# Patient Record
Sex: FEMALE | Race: WHITE | Employment: UNEMPLOYED | URBAN - METROPOLITAN AREA
[De-identification: names, ages, dates, MRNs, and addresses within clinical notes are randomized per-mention and may not be internally consistent; named-entity substitution may affect disease eponyms.]

---

## 2018-03-27 ENCOUNTER — HOSPITAL ENCOUNTER (OUTPATIENT)
Age: 33
Discharge: HOME | End: 2018-03-29
Payer: COMMERCIAL

## 2018-03-27 PROCEDURE — G0123 SCREEN CERV/VAG THIN LAYER: HCPCS

## 2018-03-27 PROCEDURE — 87624 HPV HI-RISK TYP POOLED RSLT: CPT

## 2018-03-29 LAB
CORRESPONDING PAP CASE #: NORMAL
HPV, HIGH RISK: NEGATIVE

## 2019-07-15 ENCOUNTER — APPOINTMENT (OUTPATIENT)
Dept: CT IMAGING | Age: 34
DRG: 773 | End: 2019-07-15
Payer: COMMERCIAL

## 2019-07-15 ENCOUNTER — HOSPITAL ENCOUNTER (INPATIENT)
Age: 34
LOS: 2 days | Discharge: HOME | DRG: 773 | End: 2019-07-17
Attending: EMERGENCY MEDICINE | Admitting: INTERNAL MEDICINE
Payer: COMMERCIAL

## 2019-07-15 ENCOUNTER — APPOINTMENT (OUTPATIENT)
Dept: GENERAL RADIOLOGY | Age: 34
DRG: 773 | End: 2019-07-15
Payer: COMMERCIAL

## 2019-07-15 DIAGNOSIS — A41.9 SEPTICEMIA (HCC): ICD-10-CM

## 2019-07-15 DIAGNOSIS — N30.01 ACUTE CYSTITIS WITH HEMATURIA: Primary | ICD-10-CM

## 2019-07-15 DIAGNOSIS — R74.01 TRANSAMINITIS: ICD-10-CM

## 2019-07-15 DIAGNOSIS — N17.9 AKI (ACUTE KIDNEY INJURY) (HCC): ICD-10-CM

## 2019-07-15 DIAGNOSIS — R41.82 ALTERED MENTAL STATUS, UNSPECIFIED ALTERED MENTAL STATUS TYPE: ICD-10-CM

## 2019-07-15 LAB
ACETAMINOPHEN LEVEL: <5 MCG/ML (ref 10–30)
ALBUMIN SERPL-MCNC: 4 G/DL (ref 3.5–5.2)
ALBUMIN SERPL-MCNC: 4.7 G/DL (ref 3.5–5.2)
ALP BLD-CCNC: 77 U/L (ref 35–104)
ALP BLD-CCNC: 98 U/L (ref 35–104)
ALT SERPL-CCNC: 48 U/L (ref 0–32)
ALT SERPL-CCNC: 69 U/L (ref 0–32)
AMMONIA: 24 UMOL/L (ref 11–51)
AMPHETAMINE SCREEN, URINE: NOT DETECTED
ANION GAP SERPL CALCULATED.3IONS-SCNC: 10 MMOL/L (ref 7–16)
ANION GAP SERPL CALCULATED.3IONS-SCNC: 16 MMOL/L (ref 7–16)
APPEARANCE CSF: CLEAR
APTT: 30.3 SEC (ref 24.5–35.1)
AST SERPL-CCNC: 81 U/L (ref 0–31)
AST SERPL-CCNC: 83 U/L (ref 0–31)
B.E.: 3 MMOL/L (ref -3–3)
BACTERIA: ABNORMAL /HPF
BARBITURATE SCREEN URINE: NOT DETECTED
BASOPHILS ABSOLUTE: 0 E9/L (ref 0–0.2)
BASOPHILS ABSOLUTE: 0.02 E9/L (ref 0–0.2)
BASOPHILS RELATIVE PERCENT: 0.1 % (ref 0–2)
BASOPHILS RELATIVE PERCENT: 0.1 % (ref 0–2)
BENZODIAZEPINE SCREEN, URINE: NOT DETECTED
BILIRUB SERPL-MCNC: 1 MG/DL (ref 0–1.2)
BILIRUB SERPL-MCNC: 1.5 MG/DL (ref 0–1.2)
BILIRUBIN DIRECT: 0.5 MG/DL (ref 0–0.3)
BILIRUBIN URINE: ABNORMAL
BILIRUBIN, INDIRECT: 0.5 MG/DL (ref 0–1)
BLOOD, URINE: ABNORMAL
BUN BLDV-MCNC: 38 MG/DL (ref 6–20)
BUN BLDV-MCNC: 55 MG/DL (ref 6–20)
CALCIUM SERPL-MCNC: 8.6 MG/DL (ref 8.6–10.2)
CALCIUM SERPL-MCNC: 9.4 MG/DL (ref 8.6–10.2)
CANNABINOID SCREEN URINE: NOT DETECTED
CHLORIDE BLD-SCNC: 100 MMOL/L (ref 98–107)
CHLORIDE BLD-SCNC: 110 MMOL/L (ref 98–107)
CHLORIDE URINE RANDOM: <20 MMOL/L
CHP ED QC CHECK: YES
CLARITY: ABNORMAL
CO2: 29 MMOL/L (ref 22–29)
CO2: 33 MMOL/L (ref 22–29)
COCAINE METABOLITE SCREEN URINE: NOT DETECTED
COHB: 0.4 % (ref 0–1.5)
COLOR CSF: COLORLESS
COLOR: ABNORMAL
CREAT SERPL-MCNC: 0.9 MG/DL (ref 0.5–1)
CREAT SERPL-MCNC: 1.3 MG/DL (ref 0.5–1)
CREATININE URINE: 193 MG/DL (ref 29–226)
CRITICAL: ABNORMAL
DATE ANALYZED: ABNORMAL
DATE OF COLLECTION: ABNORMAL
EOSINOPHILS ABSOLUTE: 0 E9/L (ref 0.05–0.5)
EOSINOPHILS ABSOLUTE: 0 E9/L (ref 0.05–0.5)
EOSINOPHILS RELATIVE PERCENT: 0 % (ref 0–6)
EOSINOPHILS RELATIVE PERCENT: 0 % (ref 0–6)
EPITHELIAL CELLS, UA: ABNORMAL /HPF
ETHANOL: <10 MG/DL (ref 0–0.08)
GFR AFRICAN AMERICAN: 57
GFR AFRICAN AMERICAN: >60
GFR NON-AFRICAN AMERICAN: 47 ML/MIN/1.73
GFR NON-AFRICAN AMERICAN: >60 ML/MIN/1.73
GLUCOSE BLD-MCNC: 138 MG/DL (ref 74–99)
GLUCOSE BLD-MCNC: 154 MG/DL (ref 74–99)
GLUCOSE BLD-MCNC: 155 MG/DL
GLUCOSE URINE: NEGATIVE MG/DL
GLUCOSE, CSF: 98 MG/DL (ref 40–70)
GRAM STAIN ORDERABLE: NORMAL
HCG QUALITATIVE: NEGATIVE
HCG, URINE, POC: NEGATIVE
HCO3: 25.3 MMOL/L (ref 22–26)
HCT VFR BLD CALC: 35.6 % (ref 34–48)
HCT VFR BLD CALC: 43.6 % (ref 34–48)
HEMOGLOBIN: 11.5 G/DL (ref 11.5–15.5)
HEMOGLOBIN: 13.9 G/DL (ref 11.5–15.5)
HHB: 0.5 % (ref 0–5)
IMMATURE GRANULOCYTES #: 0.14 E9/L
IMMATURE GRANULOCYTES %: 0.8 % (ref 0–5)
INR BLD: 1.3
KETONES, URINE: 15 MG/DL
LAB: ABNORMAL
LACTIC ACID: 2.2 MMOL/L (ref 0.5–2.2)
LEUKOCYTE ESTERASE, URINE: ABNORMAL
LYMPHOCYTES ABSOLUTE: 1.3 E9/L (ref 1.5–4)
LYMPHOCYTES ABSOLUTE: 2.63 E9/L (ref 1.5–4)
LYMPHOCYTES RELATIVE PERCENT: 12.2 % (ref 20–42)
LYMPHOCYTES RELATIVE PERCENT: 7.5 % (ref 20–42)
Lab: ABNORMAL
Lab: NORMAL
MCH RBC QN AUTO: 29.8 PG (ref 26–35)
MCH RBC QN AUTO: 30.1 PG (ref 26–35)
MCHC RBC AUTO-ENTMCNC: 31.9 % (ref 32–34.5)
MCHC RBC AUTO-ENTMCNC: 32.3 % (ref 32–34.5)
MCV RBC AUTO: 93.2 FL (ref 80–99.9)
MCV RBC AUTO: 93.6 FL (ref 80–99.9)
METHADONE SCREEN, URINE: NOT DETECTED
METHB: 0.4 % (ref 0–1.5)
MODE: ABNORMAL
MONOCYTE, CSF: 50 % (ref 10–70)
MONOCYTES ABSOLUTE: 0.88 E9/L (ref 0.1–0.95)
MONOCYTES ABSOLUTE: 0.94 E9/L (ref 0.1–0.95)
MONOCYTES RELATIVE PERCENT: 4.3 % (ref 2–12)
MONOCYTES RELATIVE PERCENT: 5.4 % (ref 2–12)
NEGATIVE QC PASS/FAIL: NORMAL
NEUTROPHILS ABSOLUTE: 15 E9/L (ref 1.8–7.3)
NEUTROPHILS ABSOLUTE: 18.4 E9/L (ref 1.8–7.3)
NEUTROPHILS RELATIVE PERCENT: 83.5 % (ref 43–80)
NEUTROPHILS RELATIVE PERCENT: 86.2 % (ref 43–80)
NEUTROPHILS, CSF: 50 % (ref 0–10)
NITRITE, URINE: POSITIVE
O2 CONTENT: 22.3 ML/DL
O2 SATURATION: 99.5 % (ref 92–98.5)
O2HB: 98.7 % (ref 94–97)
OPERATOR ID: 1868
OPIATE SCREEN URINE: NOT DETECTED
OVALOCYTES: ABNORMAL
PATIENT TEMP: 37 C
PCO2: 32.4 MMHG (ref 35–45)
PDW BLD-RTO: 13.8 FL (ref 11.5–15)
PDW BLD-RTO: 13.9 FL (ref 11.5–15)
PH BLOOD GAS: 7.51 (ref 7.35–7.45)
PH UA: 5.5 (ref 5–9)
PHENCYCLIDINE SCREEN URINE: NOT DETECTED
PLATELET # BLD: 222 E9/L (ref 130–450)
PLATELET # BLD: 297 E9/L (ref 130–450)
PMV BLD AUTO: 10.2 FL (ref 7–12)
PMV BLD AUTO: 9.8 FL (ref 7–12)
PO2: 242.5 MMHG (ref 60–100)
POIKILOCYTES: ABNORMAL
POSITIVE QC PASS/FAIL: NORMAL
POTASSIUM SERPL-SCNC: 3.3 MMOL/L (ref 3.5–5)
POTASSIUM SERPL-SCNC: 3.7 MMOL/L (ref 3.5–5)
POTASSIUM, UR: 53.1 MMOL/L
PROPOXYPHENE SCREEN: NOT DETECTED
PROTEIN CSF: 52 MG/DL (ref 15–40)
PROTEIN UA: 100 MG/DL
PROTHROMBIN TIME: 15 SEC (ref 9.3–12.4)
RBC # BLD: 3.82 E12/L (ref 3.5–5.5)
RBC # BLD: 4.66 E12/L (ref 3.5–5.5)
RBC CSF: <2000 /UL
RBC UA: >20 /HPF (ref 0–2)
SALICYLATE, SERUM: <0.3 MG/DL (ref 0–30)
SODIUM BLD-SCNC: 149 MMOL/L (ref 132–146)
SODIUM BLD-SCNC: 149 MMOL/L (ref 132–146)
SODIUM URINE: 36 MMOL/L
SOURCE, BLOOD GAS: ABNORMAL
SPECIFIC GRAVITY UA: 1.02 (ref 1–1.03)
THB: 15.7 G/DL (ref 11.5–16.5)
TIME ANALYZED: 1255
TOTAL CK: 1163 U/L (ref 20–180)
TOTAL PROTEIN: 7.2 G/DL (ref 6.4–8.3)
TOTAL PROTEIN: 8.7 G/DL (ref 6.4–8.3)
TRICYCLIC ANTIDEPRESSANTS SCREEN SERUM: NEGATIVE NG/ML
TROPONIN: <0.01 NG/ML (ref 0–0.03)
TUBE NUMBER CSF: ABNORMAL
UROBILINOGEN, URINE: 4 E.U./DL
WBC # BLD: 17.4 E9/L (ref 4.5–11.5)
WBC # BLD: 21.9 E9/L (ref 4.5–11.5)
WBC CSF: <3 /UL (ref 0–2)
WBC UA: ABNORMAL /HPF (ref 0–5)

## 2019-07-15 PROCEDURE — 70450 CT HEAD/BRAIN W/O DYE: CPT

## 2019-07-15 PROCEDURE — 84703 CHORIONIC GONADOTROPIN ASSAY: CPT

## 2019-07-15 PROCEDURE — 87040 BLOOD CULTURE FOR BACTERIA: CPT

## 2019-07-15 PROCEDURE — 89051 BODY FLUID CELL COUNT: CPT

## 2019-07-15 PROCEDURE — 82805 BLOOD GASES W/O2 SATURATION: CPT

## 2019-07-15 PROCEDURE — 87205 SMEAR GRAM STAIN: CPT

## 2019-07-15 PROCEDURE — 96375 TX/PRO/DX INJ NEW DRUG ADDON: CPT

## 2019-07-15 PROCEDURE — 84157 ASSAY OF PROTEIN OTHER: CPT

## 2019-07-15 PROCEDURE — 87088 URINE BACTERIA CULTURE: CPT

## 2019-07-15 PROCEDURE — 6360000002 HC RX W HCPCS: Performed by: INTERNAL MEDICINE

## 2019-07-15 PROCEDURE — 85025 COMPLETE CBC W/AUTO DIFF WBC: CPT

## 2019-07-15 PROCEDURE — 6370000000 HC RX 637 (ALT 250 FOR IP): Performed by: STUDENT IN AN ORGANIZED HEALTH CARE EDUCATION/TRAINING PROGRAM

## 2019-07-15 PROCEDURE — 2060000000 HC ICU INTERMEDIATE R&B

## 2019-07-15 PROCEDURE — 85730 THROMBOPLASTIN TIME PARTIAL: CPT

## 2019-07-15 PROCEDURE — 82140 ASSAY OF AMMONIA: CPT

## 2019-07-15 PROCEDURE — G0480 DRUG TEST DEF 1-7 CLASSES: HCPCS

## 2019-07-15 PROCEDURE — 99285 EMERGENCY DEPT VISIT HI MDM: CPT

## 2019-07-15 PROCEDURE — 6360000002 HC RX W HCPCS

## 2019-07-15 PROCEDURE — 82550 ASSAY OF CK (CPK): CPT

## 2019-07-15 PROCEDURE — 87070 CULTURE OTHR SPECIMN AEROBIC: CPT

## 2019-07-15 PROCEDURE — 83605 ASSAY OF LACTIC ACID: CPT

## 2019-07-15 PROCEDURE — 6360000002 HC RX W HCPCS: Performed by: STUDENT IN AN ORGANIZED HEALTH CARE EDUCATION/TRAINING PROGRAM

## 2019-07-15 PROCEDURE — 80307 DRUG TEST PRSMV CHEM ANLYZR: CPT

## 2019-07-15 PROCEDURE — 82570 ASSAY OF URINE CREATININE: CPT

## 2019-07-15 PROCEDURE — 82248 BILIRUBIN DIRECT: CPT

## 2019-07-15 PROCEDURE — 84484 ASSAY OF TROPONIN QUANT: CPT

## 2019-07-15 PROCEDURE — 93005 ELECTROCARDIOGRAM TRACING: CPT | Performed by: EMERGENCY MEDICINE

## 2019-07-15 PROCEDURE — 2580000003 HC RX 258: Performed by: EMERGENCY MEDICINE

## 2019-07-15 PROCEDURE — 82436 ASSAY OF URINE CHLORIDE: CPT

## 2019-07-15 PROCEDURE — 80053 COMPREHEN METABOLIC PANEL: CPT

## 2019-07-15 PROCEDURE — 81001 URINALYSIS AUTO W/SCOPE: CPT

## 2019-07-15 PROCEDURE — 36415 COLL VENOUS BLD VENIPUNCTURE: CPT

## 2019-07-15 PROCEDURE — 2580000003 HC RX 258: Performed by: INTERNAL MEDICINE

## 2019-07-15 PROCEDURE — 96367 TX/PROPH/DG ADDL SEQ IV INF: CPT

## 2019-07-15 PROCEDURE — 6360000002 HC RX W HCPCS: Performed by: EMERGENCY MEDICINE

## 2019-07-15 PROCEDURE — 96365 THER/PROPH/DIAG IV INF INIT: CPT

## 2019-07-15 PROCEDURE — 82945 GLUCOSE OTHER FLUID: CPT

## 2019-07-15 PROCEDURE — 2580000003 HC RX 258: Performed by: STUDENT IN AN ORGANIZED HEALTH CARE EDUCATION/TRAINING PROGRAM

## 2019-07-15 PROCEDURE — 71045 X-RAY EXAM CHEST 1 VIEW: CPT

## 2019-07-15 PROCEDURE — 84133 ASSAY OF URINE POTASSIUM: CPT

## 2019-07-15 PROCEDURE — 85610 PROTHROMBIN TIME: CPT

## 2019-07-15 PROCEDURE — 84300 ASSAY OF URINE SODIUM: CPT

## 2019-07-15 RX ORDER — KETOROLAC TROMETHAMINE 30 MG/ML
15 INJECTION, SOLUTION INTRAMUSCULAR; INTRAVENOUS ONCE
Status: COMPLETED | OUTPATIENT
Start: 2019-07-15 | End: 2019-07-15

## 2019-07-15 RX ORDER — SODIUM CHLORIDE, SODIUM LACTATE, POTASSIUM CHLORIDE, CALCIUM CHLORIDE 600; 310; 30; 20 MG/100ML; MG/100ML; MG/100ML; MG/100ML
INJECTION, SOLUTION INTRAVENOUS CONTINUOUS
Status: DISCONTINUED | OUTPATIENT
Start: 2019-07-15 | End: 2019-07-16

## 2019-07-15 RX ORDER — SODIUM CHLORIDE 9 MG/ML
INJECTION, SOLUTION INTRAVENOUS CONTINUOUS
Status: DISCONTINUED | OUTPATIENT
Start: 2019-07-15 | End: 2019-07-15

## 2019-07-15 RX ORDER — ACETAMINOPHEN 325 MG/1
650 TABLET ORAL EVERY 4 HOURS PRN
Status: DISCONTINUED | OUTPATIENT
Start: 2019-07-15 | End: 2019-07-17 | Stop reason: HOSPADM

## 2019-07-15 RX ORDER — NALOXONE HYDROCHLORIDE 1 MG/ML
INJECTION INTRAMUSCULAR; INTRAVENOUS; SUBCUTANEOUS
Status: COMPLETED
Start: 2019-07-15 | End: 2019-07-15

## 2019-07-15 RX ORDER — SODIUM CHLORIDE 0.9 % (FLUSH) 0.9 %
10 SYRINGE (ML) INJECTION EVERY 12 HOURS SCHEDULED
Status: DISCONTINUED | OUTPATIENT
Start: 2019-07-15 | End: 2019-07-17 | Stop reason: HOSPADM

## 2019-07-15 RX ORDER — DEXAMETHASONE SODIUM PHOSPHATE 10 MG/ML
10 INJECTION INTRAMUSCULAR; INTRAVENOUS ONCE
Status: COMPLETED | OUTPATIENT
Start: 2019-07-15 | End: 2019-07-15

## 2019-07-15 RX ORDER — NALOXONE HYDROCHLORIDE 1 MG/ML
2 INJECTION INTRAMUSCULAR; INTRAVENOUS; SUBCUTANEOUS ONCE
Status: COMPLETED | OUTPATIENT
Start: 2019-07-15 | End: 2019-07-15

## 2019-07-15 RX ORDER — SODIUM CHLORIDE 0.9 % (FLUSH) 0.9 %
10 SYRINGE (ML) INJECTION PRN
Status: DISCONTINUED | OUTPATIENT
Start: 2019-07-15 | End: 2019-07-17 | Stop reason: HOSPADM

## 2019-07-15 RX ORDER — ONDANSETRON 2 MG/ML
4 INJECTION INTRAMUSCULAR; INTRAVENOUS ONCE
Status: DISCONTINUED | OUTPATIENT
Start: 2019-07-15 | End: 2019-07-17 | Stop reason: HOSPADM

## 2019-07-15 RX ORDER — POTASSIUM CHLORIDE 7.45 MG/ML
10 INJECTION INTRAVENOUS PRN
Status: DISCONTINUED | OUTPATIENT
Start: 2019-07-15 | End: 2019-07-17

## 2019-07-15 RX ORDER — ONDANSETRON 2 MG/ML
4 INJECTION INTRAMUSCULAR; INTRAVENOUS ONCE
Status: COMPLETED | OUTPATIENT
Start: 2019-07-15 | End: 2019-07-15

## 2019-07-15 RX ORDER — 0.9 % SODIUM CHLORIDE 0.9 %
1000 INTRAVENOUS SOLUTION INTRAVENOUS ONCE
Status: COMPLETED | OUTPATIENT
Start: 2019-07-15 | End: 2019-07-15

## 2019-07-15 RX ORDER — ONDANSETRON 2 MG/ML
4 INJECTION INTRAMUSCULAR; INTRAVENOUS EVERY 4 HOURS PRN
Status: DISCONTINUED | OUTPATIENT
Start: 2019-07-15 | End: 2019-07-17 | Stop reason: HOSPADM

## 2019-07-15 RX ORDER — ACETAMINOPHEN 650 MG/1
650 SUPPOSITORY RECTAL ONCE
Status: COMPLETED | OUTPATIENT
Start: 2019-07-15 | End: 2019-07-15

## 2019-07-15 RX ORDER — POTASSIUM CHLORIDE 20 MEQ/1
40 TABLET, EXTENDED RELEASE ORAL PRN
Status: DISCONTINUED | OUTPATIENT
Start: 2019-07-15 | End: 2019-07-17

## 2019-07-15 RX ADMIN — SODIUM CHLORIDE: 9 INJECTION, SOLUTION INTRAVENOUS at 15:30

## 2019-07-15 RX ADMIN — DEXAMETHASONE SODIUM PHOSPHATE 10 MG: 10 INJECTION INTRAMUSCULAR; INTRAVENOUS at 15:04

## 2019-07-15 RX ADMIN — Medication 10 ML: at 21:05

## 2019-07-15 RX ADMIN — VANCOMYCIN HYDROCHLORIDE 1250 MG: 10 INJECTION, POWDER, LYOPHILIZED, FOR SOLUTION INTRAVENOUS at 17:43

## 2019-07-15 RX ADMIN — SODIUM CHLORIDE 1000 ML: 9 INJECTION, SOLUTION INTRAVENOUS at 13:05

## 2019-07-15 RX ADMIN — ACYCLOVIR SODIUM 580 MG: 50 INJECTION, SOLUTION INTRAVENOUS at 16:26

## 2019-07-15 RX ADMIN — NALOXONE HYDROCHLORIDE 2 MG: 1 INJECTION INTRAMUSCULAR; INTRAVENOUS; SUBCUTANEOUS at 12:47

## 2019-07-15 RX ADMIN — ENOXAPARIN SODIUM 40 MG: 40 INJECTION SUBCUTANEOUS at 21:04

## 2019-07-15 RX ADMIN — ACETAMINOPHEN 650 MG: 650 SUPPOSITORY RECTAL at 15:05

## 2019-07-15 RX ADMIN — KETOROLAC TROMETHAMINE 15 MG: 30 INJECTION, SOLUTION INTRAMUSCULAR at 16:26

## 2019-07-15 RX ADMIN — ONDANSETRON HYDROCHLORIDE 4 MG: 2 SOLUTION INTRAMUSCULAR; INTRAVENOUS at 15:29

## 2019-07-15 RX ADMIN — NALOXONE HYDROCHLORIDE 2 MG: 1 INJECTION PARENTERAL at 12:47

## 2019-07-15 RX ADMIN — CEFTRIAXONE 2 G: 2 INJECTION, POWDER, FOR SOLUTION INTRAMUSCULAR; INTRAVENOUS at 15:00

## 2019-07-15 ASSESSMENT — PAIN SCALES - GENERAL
PAINLEVEL_OUTOF10: 0
PAINLEVEL_OUTOF10: 3
PAINLEVEL_OUTOF10: 0
PAINLEVEL_OUTOF10: 0

## 2019-07-15 NOTE — ED NOTES
Central Monitoring@ 6631  [facility] [place]:  Aylin Handing Paul  07/15/19 7203

## 2019-07-15 NOTE — ED PROVIDER NOTES
Immature Granulocytes % 0.8 0.0 - 5.0 %    Lymphocytes % 7.5 (L) 20.0 - 42.0 %    Monocytes % 5.4 2.0 - 12.0 %    Eosinophils % 0.0 0.0 - 6.0 %    Basophils % 0.1 0.0 - 2.0 %    Neutrophils # 15.00 (H) 1.80 - 7.30 E9/L    Immature Granulocytes # 0.14 E9/L    Lymphocytes # 1.30 (L) 1.50 - 4.00 E9/L    Monocytes # 0.94 0.10 - 0.95 E9/L    Eosinophils # 0.00 (L) 0.05 - 0.50 E9/L    Basophils # 0.02 0.00 - 0.20 E9/L   Hepatic function panel   Result Value Ref Range    Bilirubin, Direct 0.5 (H) 0.0 - 0.3 mg/dL    Bilirubin, Indirect 0.5 0.0 - 1.0 mg/dL   POCT Glucose   Result Value Ref Range    Glucose 155 mg/dL    QC OK? yes    POC Pregnancy Urine Qual   Result Value Ref Range    HCG, Urine, POC Negative Negative    Lot Number [id number]     Positive QC Pass/Fail Pass     Negative QC Pass/Fail Pass    EKG 12 Lead   Result Value Ref Range    Ventricular Rate 104 BPM    Atrial Rate 104 BPM    P-R Interval 130 ms    QRS Duration 70 ms    Q-T Interval 344 ms    QTc Calculation (Bazett) 452 ms    P Axis 82 degrees    R Axis 75 degrees    T Axis -85 degrees       RADIOLOGY:  XR CHEST PORTABLE   Final Result   No acute cardiopulmonary findings. CT Head WO Contrast   Final Result   1. No CT evidence of acute intracranial abnormality, as questioned. If   there is clinical concern for potential underlying acute   cerebrovascular infarction/accident or other potential abnormalities   of underlying brain parenchyma, MRI of the brain would be recommended   for more detailed evaluation. .          EKG: This EKG is signed and interpreted by me. Rate: 104  Rhythm: Sinus  Interpretation: non-specific EKG  Comparison: no previous EKG and no previous EKG available      ------------------------- NURSING NOTES AND VITALS REVIEWED ---------------------------  Date / Time Roomed:  7/15/2019 12:42 PM  ED Bed Assignment:  [id number]    The nursing notes within the ED encounter and vital signs as below have been reviewed.      Patient

## 2019-07-15 NOTE — ED NOTES
UNABLE TO OBTAIN LAB FROM [facility] AT THIS TIME, PER DR MATHUR. PREPPING PT FOR CENTRAL LINE PLACEMENT.       Celso Wood RN  07/15/19 540 Karen Segovia RN  07/15/19 0670

## 2019-07-16 PROBLEM — R00.0 SINUS TACHYCARDIA: Status: ACTIVE | Noted: 2019-07-16

## 2019-07-16 PROBLEM — R50.9 FEVER: Status: ACTIVE | Noted: 2019-07-16

## 2019-07-16 PROBLEM — G93.40 ENCEPHALOPATHY ACUTE: Status: ACTIVE | Noted: 2019-07-16

## 2019-07-16 PROBLEM — E86.0 DEHYDRATION: Status: ACTIVE | Noted: 2019-07-16

## 2019-07-16 PROBLEM — E87.0 HYPERNATREMIA: Status: ACTIVE | Noted: 2019-07-16

## 2019-07-16 LAB
ALBUMIN SERPL-MCNC: 3.8 G/DL (ref 3.5–5.2)
ALP BLD-CCNC: 69 U/L (ref 35–104)
ALT SERPL-CCNC: 62 U/L (ref 0–32)
ANION GAP SERPL CALCULATED.3IONS-SCNC: 10 MMOL/L (ref 7–16)
ANION GAP SERPL CALCULATED.3IONS-SCNC: 9 MMOL/L (ref 7–16)
AST SERPL-CCNC: 52 U/L (ref 0–31)
BASOPHILS ABSOLUTE: 0.01 E9/L (ref 0–0.2)
BASOPHILS RELATIVE PERCENT: 0.1 % (ref 0–2)
BILIRUB SERPL-MCNC: 0.7 MG/DL (ref 0–1.2)
BUN BLDV-MCNC: 25 MG/DL (ref 6–20)
BUN BLDV-MCNC: 30 MG/DL (ref 6–20)
CALCIUM SERPL-MCNC: 8.3 MG/DL (ref 8.6–10.2)
CALCIUM SERPL-MCNC: 8.8 MG/DL (ref 8.6–10.2)
CHLORIDE BLD-SCNC: 109 MMOL/L (ref 98–107)
CHLORIDE BLD-SCNC: 112 MMOL/L (ref 98–107)
CO2: 27 MMOL/L (ref 22–29)
CO2: 29 MMOL/L (ref 22–29)
CREAT SERPL-MCNC: 0.7 MG/DL (ref 0.5–1)
CREAT SERPL-MCNC: 0.9 MG/DL (ref 0.5–1)
EKG ATRIAL RATE: 104 BPM
EKG P AXIS: 82 DEGREES
EKG P-R INTERVAL: 130 MS
EKG Q-T INTERVAL: 344 MS
EKG QRS DURATION: 70 MS
EKG QTC CALCULATION (BAZETT): 452 MS
EKG R AXIS: 75 DEGREES
EKG T AXIS: -85 DEGREES
EKG VENTRICULAR RATE: 104 BPM
EOSINOPHILS ABSOLUTE: 0 E9/L (ref 0.05–0.5)
EOSINOPHILS RELATIVE PERCENT: 0 % (ref 0–6)
FILM ARRAY ADENOVIRUS: NORMAL
FILM ARRAY BORDETELLA PERTUSSIS: NORMAL
FILM ARRAY CHLAMYDOPHILIA PNEUMONIAE: NORMAL
FILM ARRAY CORONAVIRUS 229E: NORMAL
FILM ARRAY CORONAVIRUS HKU1: NORMAL
FILM ARRAY CORONAVIRUS NL63: NORMAL
FILM ARRAY CORONAVIRUS OC43: NORMAL
FILM ARRAY INFLUENZA A VIRUS 09H1: NORMAL
FILM ARRAY INFLUENZA A VIRUS H1: NORMAL
FILM ARRAY INFLUENZA A VIRUS H3: NORMAL
FILM ARRAY INFLUENZA A VIRUS: NORMAL
FILM ARRAY INFLUENZA B: NORMAL
FILM ARRAY METAPNEUMOVIRUS: NORMAL
FILM ARRAY MYCOPLASMA PNEUMONIAE: NORMAL
FILM ARRAY PARAINFLUENZA VIRUS 1: NORMAL
FILM ARRAY PARAINFLUENZA VIRUS 2: NORMAL
FILM ARRAY PARAINFLUENZA VIRUS 3: NORMAL
FILM ARRAY PARAINFLUENZA VIRUS 4: NORMAL
FILM ARRAY RESPIRATORY SYNCITIAL VIRUS: NORMAL
FILM ARRAY RHINOVIRUS/ENTEROVIRUS: NORMAL
GFR AFRICAN AMERICAN: >60
GFR AFRICAN AMERICAN: >60
GFR NON-AFRICAN AMERICAN: >60 ML/MIN/1.73
GFR NON-AFRICAN AMERICAN: >60 ML/MIN/1.73
GLUCOSE BLD-MCNC: 109 MG/DL (ref 74–99)
GLUCOSE BLD-MCNC: 122 MG/DL (ref 74–99)
HCT VFR BLD CALC: 32.7 % (ref 34–48)
HEMOGLOBIN: 10.3 G/DL (ref 11.5–15.5)
IMMATURE GRANULOCYTES #: 0.12 E9/L
IMMATURE GRANULOCYTES %: 0.8 % (ref 0–5)
LYMPHOCYTES ABSOLUTE: 2.02 E9/L (ref 1.5–4)
LYMPHOCYTES RELATIVE PERCENT: 12.9 % (ref 20–42)
MCH RBC QN AUTO: 29.8 PG (ref 26–35)
MCHC RBC AUTO-ENTMCNC: 31.5 % (ref 32–34.5)
MCV RBC AUTO: 94.5 FL (ref 80–99.9)
MONOCYTES ABSOLUTE: 1.46 E9/L (ref 0.1–0.95)
MONOCYTES RELATIVE PERCENT: 9.3 % (ref 2–12)
NEUTROPHILS ABSOLUTE: 12.03 E9/L (ref 1.8–7.3)
NEUTROPHILS RELATIVE PERCENT: 76.9 % (ref 43–80)
PDW BLD-RTO: 13.8 FL (ref 11.5–15)
PLATELET # BLD: 184 E9/L (ref 130–450)
PMV BLD AUTO: 10.2 FL (ref 7–12)
POTASSIUM SERPL-SCNC: 3.2 MMOL/L (ref 3.5–5)
POTASSIUM SERPL-SCNC: 3.9 MMOL/L (ref 3.5–5)
RBC # BLD: 3.46 E12/L (ref 3.5–5.5)
SODIUM BLD-SCNC: 145 MMOL/L (ref 132–146)
SODIUM BLD-SCNC: 151 MMOL/L (ref 132–146)
TOTAL CK: 898 U/L (ref 20–180)
TOTAL CK: 908 U/L (ref 20–180)
TOTAL PROTEIN: 6.5 G/DL (ref 6.4–8.3)
TROPONIN: <0.01 NG/ML (ref 0–0.03)
TSH SERPL DL<=0.05 MIU/L-ACNC: 0.88 UIU/ML (ref 0.27–4.2)
WBC # BLD: 15.6 E9/L (ref 4.5–11.5)

## 2019-07-16 PROCEDURE — 2580000003 HC RX 258: Performed by: INTERNAL MEDICINE

## 2019-07-16 PROCEDURE — 99223 1ST HOSP IP/OBS HIGH 75: CPT | Performed by: INTERNAL MEDICINE

## 2019-07-16 PROCEDURE — 87798 DETECT AGENT NOS DNA AMP: CPT

## 2019-07-16 PROCEDURE — 97161 PT EVAL LOW COMPLEX 20 MIN: CPT | Performed by: PHYSICAL THERAPIST

## 2019-07-16 PROCEDURE — 6360000002 HC RX W HCPCS: Performed by: INTERNAL MEDICINE

## 2019-07-16 PROCEDURE — 93010 ELECTROCARDIOGRAM REPORT: CPT | Performed by: INTERNAL MEDICINE

## 2019-07-16 PROCEDURE — 99253 IP/OBS CNSLTJ NEW/EST LOW 45: CPT | Performed by: PSYCHIATRY & NEUROLOGY

## 2019-07-16 PROCEDURE — 82550 ASSAY OF CK (CPK): CPT

## 2019-07-16 PROCEDURE — 80053 COMPREHEN METABOLIC PANEL: CPT

## 2019-07-16 PROCEDURE — 6370000000 HC RX 637 (ALT 250 FOR IP): Performed by: INTERNAL MEDICINE

## 2019-07-16 PROCEDURE — 87486 CHLMYD PNEUM DNA AMP PROBE: CPT

## 2019-07-16 PROCEDURE — 87633 RESP VIRUS 12-25 TARGETS: CPT

## 2019-07-16 PROCEDURE — 83735 ASSAY OF MAGNESIUM: CPT

## 2019-07-16 PROCEDURE — 97165 OT EVAL LOW COMPLEX 30 MIN: CPT

## 2019-07-16 PROCEDURE — 80048 BASIC METABOLIC PNL TOTAL CA: CPT

## 2019-07-16 PROCEDURE — 36415 COLL VENOUS BLD VENIPUNCTURE: CPT

## 2019-07-16 PROCEDURE — 84484 ASSAY OF TROPONIN QUANT: CPT

## 2019-07-16 PROCEDURE — 84443 ASSAY THYROID STIM HORMONE: CPT

## 2019-07-16 PROCEDURE — 2060000000 HC ICU INTERMEDIATE R&B

## 2019-07-16 PROCEDURE — 97530 THERAPEUTIC ACTIVITIES: CPT | Performed by: PHYSICAL THERAPIST

## 2019-07-16 PROCEDURE — 87581 M.PNEUMON DNA AMP PROBE: CPT

## 2019-07-16 PROCEDURE — 85025 COMPLETE CBC W/AUTO DIFF WBC: CPT

## 2019-07-16 PROCEDURE — 97535 SELF CARE MNGMENT TRAINING: CPT

## 2019-07-16 RX ORDER — LORAZEPAM 2 MG/ML
1 INJECTION INTRAMUSCULAR ONCE
Status: COMPLETED | OUTPATIENT
Start: 2019-07-16 | End: 2019-07-16

## 2019-07-16 RX ORDER — DEXTROSE AND SODIUM CHLORIDE 5; .45 G/100ML; G/100ML
INJECTION, SOLUTION INTRAVENOUS CONTINUOUS
Status: DISCONTINUED | OUTPATIENT
Start: 2019-07-16 | End: 2019-07-16

## 2019-07-16 RX ORDER — SODIUM CHLORIDE, SODIUM LACTATE, POTASSIUM CHLORIDE, CALCIUM CHLORIDE 600; 310; 30; 20 MG/100ML; MG/100ML; MG/100ML; MG/100ML
INJECTION, SOLUTION INTRAVENOUS CONTINUOUS
Status: DISCONTINUED | OUTPATIENT
Start: 2019-07-16 | End: 2019-07-17

## 2019-07-16 RX ADMIN — Medication 10 ML: at 20:14

## 2019-07-16 RX ADMIN — DEXTROSE AND SODIUM CHLORIDE: 5; 450 INJECTION, SOLUTION INTRAVENOUS at 09:49

## 2019-07-16 RX ADMIN — Medication 10 ML: at 09:49

## 2019-07-16 RX ADMIN — Medication 10 ML: at 10:02

## 2019-07-16 RX ADMIN — LORAZEPAM 1 MG: 2 INJECTION INTRAMUSCULAR; INTRAVENOUS at 04:54

## 2019-07-16 RX ADMIN — Medication 10 ML: at 11:12

## 2019-07-16 RX ADMIN — ONDANSETRON HYDROCHLORIDE 4 MG: 2 SOLUTION INTRAMUSCULAR; INTRAVENOUS at 14:58

## 2019-07-16 RX ADMIN — SODIUM CHLORIDE, POTASSIUM CHLORIDE, SODIUM LACTATE AND CALCIUM CHLORIDE: 600; 310; 30; 20 INJECTION, SOLUTION INTRAVENOUS at 22:49

## 2019-07-16 RX ADMIN — SODIUM CHLORIDE, POTASSIUM CHLORIDE, SODIUM LACTATE AND CALCIUM CHLORIDE: 600; 310; 30; 20 INJECTION, SOLUTION INTRAVENOUS at 01:58

## 2019-07-16 RX ADMIN — VANCOMYCIN HYDROCHLORIDE 1000 MG: 1 INJECTION, POWDER, LYOPHILIZED, FOR SOLUTION INTRAVENOUS at 09:49

## 2019-07-16 RX ADMIN — DEXTROSE AND SODIUM CHLORIDE: 5; 450 INJECTION, SOLUTION INTRAVENOUS at 14:57

## 2019-07-16 RX ADMIN — ENOXAPARIN SODIUM 40 MG: 40 INJECTION SUBCUTANEOUS at 09:49

## 2019-07-16 RX ADMIN — ONDANSETRON HYDROCHLORIDE 4 MG: 2 SOLUTION INTRAMUSCULAR; INTRAVENOUS at 20:14

## 2019-07-16 RX ADMIN — POTASSIUM CHLORIDE 40 MEQ: 20 TABLET, EXTENDED RELEASE ORAL at 20:04

## 2019-07-16 RX ADMIN — Medication 10 ML: at 14:58

## 2019-07-16 RX ADMIN — VANCOMYCIN HYDROCHLORIDE 1000 MG: 1 INJECTION, POWDER, LYOPHILIZED, FOR SOLUTION INTRAVENOUS at 22:38

## 2019-07-16 RX ADMIN — Medication 10 ML: at 15:51

## 2019-07-16 RX ADMIN — Medication 10 ML: at 15:57

## 2019-07-16 RX ADMIN — CEFTRIAXONE SODIUM 1 G: 1 INJECTION, POWDER, FOR SOLUTION INTRAMUSCULAR; INTRAVENOUS at 14:58

## 2019-07-16 RX ADMIN — Medication 10 ML: at 15:05

## 2019-07-16 ASSESSMENT — PAIN SCALES - GENERAL
PAINLEVEL_OUTOF10: 0

## 2019-07-16 ASSESSMENT — VISUAL ACUITY: VA_NORMAL: 1

## 2019-07-16 NOTE — H&P
Female Order Date: 7/15/2019 1:00 PM Exam: XR CHEST PORTABLE Number of Images: 1 view Indication:  Possible sepsis Comparison: None. FINDINGS: The lungs are clear. No pleural effusion or pneumothorax is seen. The cardiac silhouette is unremarkable for size. The aorta is unremarkable. A large amount of air is distending the stomach. No acute cardiopulmonary findings. EKG: ST depression in V3, V4, V5, T wave inversion in II, III, aVF    Resident's Assessment and Plan     Patient found unresponsive on the floor, brought to ED, possible ?suboxone overdose. Patient has altered mental status, mother could not be contacted for history. Neurologic  AMS - acute encephalopathy  2/2 ?suboxone overdose vs Meningitis vs Metabolic vs seizures vs psychosis  Urine drug screen negative, patient give narcan but no response  CSF: clear; glucose 98; protein 52; RBC < 2000; WBC < 3;   CT head - no evidence of acute intracranial abnormality. Check ammonia, TSH. Follow blood cultures. Neurology consulted  Psych consult    Pulmonary  Respiratory acidosis  2/2 to hyperventilation 2/2 substance abuse vs AMS  Ph 7.5  pco2 32.4  po2 242  HCO3 25.3    Infectious Disease  Leukocytosis   AMS with RR > 22  WBC 21.9 likely 2/2 dehydration  Received 1 dose of vancomycin, acyclovir and ceftriaxone.    Follow CBC daily;   cultures blood urine csf and fungal  If infectious source is suspicious consider continuing abx    Genitourinary/Renal  YOVANNY likely prerenal; baseline Cr unknown  2/2 to dehydration, poor oral intake  BUN/Cr = 55/1.3 = 42  Follow urine electrolytes and creatinine  Continue IVF    Hypernatremia and hypokalemia  Na 149  K 3.3  2/2 to poor oral intake and dehydration  IVF; replace K as needed  CMP daily    Hematuria  2/2 ?renal stones   U/A rbc>20   Follow cultures    Rhabdomyolysis  2/2 tremors vs seizure vs dehydration  Patient found unresponsive on the floor  CK 1163  Trend CK q6hr  Continue IVF    PT/OT evaluation:

## 2019-07-16 NOTE — PROGRESS NOTES
[facility] 7WE IM  Physical Therapy Initial Evaluation    Name: Amy Oreilly  : 1985  MRN: [id number]    Date of Service: 2019        Evaluating Therapist: Chrissy Renteria PT, DPT       Equipment Recommendations: to be determined. Room #: 7616/5539-X  DIAGNOSIS: AMS   PRECAUTIONS: fall risk, staff member present, droplet isolation    Social:  Pt lives with ?? in a ?? floor plan ? steps and ? rails to enter. When asked pt questions, \"I have a home ? \"   Prior to admission pt walked with ? Initial Evaluation  Date:  Treatment      Short Term/ Long Term   Goals   Was pt agreeable to Eval/treatment? yes     Does pt have pain? No c/o pain      Bed Mobility  Rolling: SBA  Supine to sit: SBA  Sit to supine: SBA  Scooting: NT  supervision   Transfers Sit to stand: min A   Stand to sit: min A  Stand pivot: min A   SBA   Ambulation    30 feet with no device with min/mod A   75+ feet with AAD with SBA   Stair negotiation: ascended and descended  NT  4 steps with 2 rail with SBA   AM-PAC Raw Score 16/24       Pt is alert and Oriented x2   ROM:  L LE grossly WFL  R LE grossly WFL  Strength:   L LE grossly at least 3/5  R LE grossly at least 3/5  Balance: standing min/mod A   Sensation: no c/o numbness/tingling. Endurance: fair      ASSESSMENT  Pt displays functional ability as noted in the objective portion of this evaluation. Comments/Treatment:  Pt found laying in bed agreeable to evaluation. Pt instructed in mobility. Pt demonstrates confusion during session. Pt unsteady with gait and demonstrates tremors. RN in during session. Pt left laying in bed with call button in reach and staff member present end of evaluation. Patient education  Pt educated on mobility. Patient response to education:   Pt verbalized understanding Pt demonstrated skill Pt requires further education in this area     x     Rehab potential is Good for reaching above PT goals. Pts/ family goals   1.  None
Iona Salazar for admitting orders.
OCCUPATIONAL THERAPY INITIAL EVALUATION      Date:2019  Patient Name: Mariam Moreno  MRN: [id number]  : 1985  Room: [address] [place] [address]    Evaluating OT: Jose Luis Brewster, OTR/L [id number]    AM-PAC Daily Activity Raw Score:     Recommended Adaptive Equipment: TBD    Diagnosis: Altered mental status   Surgery: No past surgical history on file. Pertinent Medical History: No past medical history on file. Precautions:  Falls, alarms, droplet isolation, sitter     Home Living: Pt lives with her mother per nursing, however pt is a poor historian and is unable to provide information on home setup, often stating \" I don't remember\" . Prior Level of Function: Pt unable to provide information on prior level of performance. Pain Level: Pt did not indicate pain at present time    Cognition: A&O: . Confusion and memory issues noted throughout   Problem solving:  Poor   Judgement/safety:  Poor     Functional Assessment:   Initial Eval Status  Date: 19 Treatment session:  Short Term Goals  Treatment frequency: 2-5x/wk PRN x1-3 wks   Feeding Min A drinking water from unlided cup  I   Grooming Mod A   I   UB Dressing Min A   I   LB Dressing Mod A   I    Bathing DNA  I   Toileting Mod A   I   Bed Mobility  Supine to sit: I      Functional Transfers Sit to stand Min A   I    Functional Mobility Min/ Mod A dues to unsteady gait  I  during ADLs   Balance Sitting: Fair    Standing: Fair-     Activity Tolerance Fair-  Good iesha x15 min with Good balance during self care tasks           ADL comments: Pt brushed teeth with sponge and used mouth wash while standing at sink with extreme lean on sink. She was able to rubén socks sitting at EOB independently.      Strength ROM Additional Info:    RUE  4/5 WFL Whole body tremors noted throughout body impacting coordination   Good  and  Fair coordination FMC/dexterity noted during ADL tasks     LUE 4/5 WFL Whole body tremors noted throughout body impacting coordination   Good
with Differential:    Lab Results   Component Value Date    WBC 15.6 07/16/2019    RBC 3.46 07/16/2019    HGB 10.3 07/16/2019    HCT 32.7 07/16/2019     07/16/2019    MCV 94.5 07/16/2019    MCH 29.8 07/16/2019    MCHC 31.5 07/16/2019    RDW 13.8 07/16/2019    LYMPHOPCT 12.9 07/16/2019    MONOPCT 9.3 07/16/2019    BASOPCT 0.1 07/16/2019    MONOSABS 1.46 07/16/2019    LYMPHSABS 2.02 07/16/2019    EOSABS 0.00 07/16/2019    BASOSABS 0.01 07/16/2019     CMP:    Lab Results   Component Value Date     07/16/2019    K 3.9 07/16/2019     07/16/2019    CO2 29 07/16/2019    BUN 30 07/16/2019    CREATININE 0.9 07/16/2019    GFRAA >60 07/16/2019    LABGLOM >60 07/16/2019    GLUCOSE 109 07/16/2019    PROT 6.5 07/16/2019    LABALBU 3.8 07/16/2019    CALCIUM 8.8 07/16/2019    BILITOT 0.7 07/16/2019    ALKPHOS 69 07/16/2019    AST 52 07/16/2019    ALT 62 07/16/2019     Hepatic Function Panel:    Lab Results   Component Value Date    ALKPHOS 69 07/16/2019    ALT 62 07/16/2019    AST 52 07/16/2019    PROT 6.5 07/16/2019    BILITOT 0.7 07/16/2019    BILIDIR 0.5 07/15/2019    IBILI 0.5 07/15/2019    LABALBU 3.8 07/16/2019       Resident's Assessment and Plan     37yo female, brought to the ED after being found unresponsive on the floor at home. PMH of heroin abuse, self d/c from rehab facility, was on ?suboxone. At ED presented with fever, altered mental status, RBC in urine, increased CK, respiratory alkalosis, no acute changes in CT head.     Neurologic  Acute encephalopathy   · 2/2 to ?suboxone overdose vs Encephalitis vs Metabolic vs Psychosis  · Urine drug screen clear; patient didn't improve on narcan given in ED  · CSF clear, glucose 98; protein 52; RBC < 2000; WBC < 3  · CT head - no evidence of acute intracranial abnormality  · Neurology consult: AMS in setting of drug withdrawal, dehydration and UTI  · Pscyh consult: Doesn't meet admission criteria, OP psych follow up  · Ammonia 24,  TSH 0.88  · Follow blood

## 2019-07-16 NOTE — PLAN OF CARE
Problem: Falls - Risk of:  Goal: Will remain free from falls  Description  Will remain free from falls  Outcome: Met This Shift     Problem: Confusion - Acute:  Goal: Absence of continued neurological deterioration signs and symptoms  Description  Absence of continued neurological deterioration signs and symptoms  Outcome: Met This Shift  Goal: Mental status will be restored to baseline  Description  Mental status will be restored to baseline  Outcome: Met This Shift     Problem: Discharge Planning:  Goal: Ability to perform activities of daily living will improve  Description  Ability to perform activities of daily living will improve  Outcome: Met This Shift  Goal: Participates in care planning  Description  Participates in care planning  Outcome: Met This Shift     Problem: Injury - Risk of, Physical Injury:  Goal: Will remain free from falls  Description  Will remain free from falls  Outcome: Met This Shift     Problem: Mood - Altered:  Goal: Mood stable  Description  Mood stable  Outcome: Met This Shift  Goal: Absence of abusive behavior  Description  Absence of abusive behavior  Outcome: Met This Shift  Goal: Verbalizations of feeling emotionally comfortable while being cared for will increase  Description  Verbalizations of feeling emotionally comfortable while being cared for will increase  Outcome: Met This Shift     Problem: Sleep Pattern Disturbance:  Goal: Appears well-rested  Description  Appears well-rested  Outcome: Met This Shift

## 2019-07-16 NOTE — PLAN OF CARE
Problem: Coping - Ineffective, Individual:  Goal: Ability to identify and develop effective coping behavior will improve  Description  Ability to identify and develop effective coping behavior will improve  Outcome: Met This Shift     Problem: Injury - Risk of, Substance Overdose:  Goal: No signs of physiological stress  Description  Absence of drug withdrawal signs and symptoms  Outcome: Met This Shift

## 2019-07-16 NOTE — CONSULTS
Amy Oreilly is a 30 y.o. female       Chief Complaint   Patient presents with    Altered Mental Status     pt just left rehab for drug abuse pt on suboxone, pt found altered mental statues by mother, 2mg narcan given intranasally PTA       HPI:    30year old woman presents after altered mental status. She does not have any memory of the events leading to presentation. History taken from mother at bedside. Pt recently decided to self D/C from suboxone For the last week she has been having withdrawal symptoms of nausea vomiting diarrhea. Her mother called EMS after she became less responsive and extremely confused. She has been found to have a UTI and dehydration. LP was negative. After fluids and antibiotics she is doing much better. 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 Prior to Visit Medications    Not on File     Social History     Tobacco Use    Smoking status: Current Some Day Smoker     Packs/day: 1.00     Years: 10.00     Pack years: 10.00     Types: Cigarettes    Smokeless tobacco: Never Used   Substance Use Topics    Alcohol use: Not Currently    Drug use: Yes     Comment: methadone-hx heroin use     No family history on file. No past surgical history on file. Past Medical History:   Diagnosis Date    Heroin abuse ([facility] [address]     Tobacco use      Review of Systems   All other systems reviewed and are negative. Objective:   /69   Pulse 71   Temp 98.2 °F (36.8 °C) (Oral)   Resp 25   Ht 5' 5\" (1.651 m)   Wt 131 lb (59.4 kg)   SpO2 98%   BMI 21.80 kg/m²     Physical Exam   Constitutional: She appears well-nourished. No distress. HENT:   Head: Normocephalic and atraumatic. Eyes: Pupils are equal, round, and reactive to light. Conjunctivae and lids are normal.   Neck: Neck supple. Cardiovascular: Normal rate and regular rhythm. Pulmonary/Chest: Effort normal and breath sounds normal.   Abdominal: Soft. Neurological: She is alert.
Goal Directed  [] Tangential  [] Circumstantial     Thought Content:  [x] Denies [] Endorses [] Suicidal [] Homicidal  [] Delusional      [] Paranoid  [] Somatic  [] Grandiose    Perception: [x]  None  [] Auditory   [] Visual  [] tactile   [] olfactory  [] Illusions         Insight: [] Intact  [x] Fair  [] Limited    Judgement:  [] Intact  [x] Fair  [] Limited        ASSESSMENT  Patient Active Problem List   Diagnosis    Altered mental status    Acute cystitis with hematuria    Encephalopathy acute    Dehydration    Hypernatremia    Fever    Sinus tachycardia     Recommendations and plan of treatment:      Patient is nirav for safety. Patient denies SI, HI, or AVH, and does not meet admission criteria. Please set up with OP psych follow up.           Signed:  Chuck Jennings  7/16/2019  11:17 AM

## 2019-07-17 VITALS
SYSTOLIC BLOOD PRESSURE: 122 MMHG | RESPIRATION RATE: 18 BRPM | TEMPERATURE: 98.1 F | WEIGHT: 135 LBS | BODY MASS INDEX: 22.49 KG/M2 | HEART RATE: 78 BPM | DIASTOLIC BLOOD PRESSURE: 81 MMHG | OXYGEN SATURATION: 98 % | HEIGHT: 65 IN

## 2019-07-17 PROBLEM — D64.9 NORMOCYTIC ANEMIA: Status: ACTIVE | Noted: 2019-07-17

## 2019-07-17 LAB
ALBUMIN SERPL-MCNC: 3.3 G/DL (ref 3.5–5.2)
ALP BLD-CCNC: 58 U/L (ref 35–104)
ALT SERPL-CCNC: 45 U/L (ref 0–32)
ANION GAP SERPL CALCULATED.3IONS-SCNC: 11 MMOL/L (ref 7–16)
ANION GAP SERPL CALCULATED.3IONS-SCNC: 5 MMOL/L (ref 7–16)
ANION GAP SERPL CALCULATED.3IONS-SCNC: 9 MMOL/L (ref 7–16)
AST SERPL-CCNC: 35 U/L (ref 0–31)
BASOPHILS ABSOLUTE: 0.01 E9/L (ref 0–0.2)
BASOPHILS RELATIVE PERCENT: 0.1 % (ref 0–2)
BILIRUB SERPL-MCNC: 0.5 MG/DL (ref 0–1.2)
BUN BLDV-MCNC: 13 MG/DL (ref 6–20)
BUN BLDV-MCNC: 15 MG/DL (ref 6–20)
BUN BLDV-MCNC: 18 MG/DL (ref 6–20)
CALCIUM SERPL-MCNC: 8.3 MG/DL (ref 8.6–10.2)
CALCIUM SERPL-MCNC: 8.4 MG/DL (ref 8.6–10.2)
CALCIUM SERPL-MCNC: 8.5 MG/DL (ref 8.6–10.2)
CHLORIDE BLD-SCNC: 106 MMOL/L (ref 98–107)
CHLORIDE BLD-SCNC: 108 MMOL/L (ref 98–107)
CHLORIDE BLD-SCNC: 108 MMOL/L (ref 98–107)
CO2: 24 MMOL/L (ref 22–29)
CO2: 24 MMOL/L (ref 22–29)
CO2: 29 MMOL/L (ref 22–29)
CREAT SERPL-MCNC: 0.8 MG/DL (ref 0.5–1)
EOSINOPHILS ABSOLUTE: 0.09 E9/L (ref 0.05–0.5)
EOSINOPHILS RELATIVE PERCENT: 0.7 % (ref 0–6)
GFR AFRICAN AMERICAN: >60
GFR NON-AFRICAN AMERICAN: >60 ML/MIN/1.73
GLUCOSE BLD-MCNC: 113 MG/DL (ref 74–99)
GLUCOSE BLD-MCNC: 96 MG/DL (ref 74–99)
GLUCOSE BLD-MCNC: 98 MG/DL (ref 74–99)
HCT VFR BLD CALC: 27.5 % (ref 34–48)
HEMOGLOBIN: 8.9 G/DL (ref 11.5–15.5)
IMMATURE GRANULOCYTES #: 0.1 E9/L
IMMATURE GRANULOCYTES %: 0.7 % (ref 0–5)
LYMPHOCYTES ABSOLUTE: 3.33 E9/L (ref 1.5–4)
LYMPHOCYTES RELATIVE PERCENT: 24.9 % (ref 20–42)
MAGNESIUM: 2.4 MG/DL (ref 1.6–2.6)
MCH RBC QN AUTO: 30.1 PG (ref 26–35)
MCHC RBC AUTO-ENTMCNC: 32.4 % (ref 32–34.5)
MCV RBC AUTO: 92.9 FL (ref 80–99.9)
MONOCYTES ABSOLUTE: 0.99 E9/L (ref 0.1–0.95)
MONOCYTES RELATIVE PERCENT: 7.4 % (ref 2–12)
NEUTROPHILS ABSOLUTE: 8.88 E9/L (ref 1.8–7.3)
NEUTROPHILS RELATIVE PERCENT: 66.2 % (ref 43–80)
PDW BLD-RTO: 13.1 FL (ref 11.5–15)
PLATELET # BLD: 141 E9/L (ref 130–450)
PMV BLD AUTO: 10.1 FL (ref 7–12)
POTASSIUM REFLEX MAGNESIUM: 3 MMOL/L (ref 3.5–5)
POTASSIUM REFLEX MAGNESIUM: 3.7 MMOL/L (ref 3.5–5)
POTASSIUM SERPL-SCNC: 3.9 MMOL/L (ref 3.5–5)
POTASSIUM SERPL-SCNC: 4.4 MMOL/L (ref 3.5–5)
RBC # BLD: 2.96 E12/L (ref 3.5–5.5)
SODIUM BLD-SCNC: 140 MMOL/L (ref 132–146)
SODIUM BLD-SCNC: 141 MMOL/L (ref 132–146)
SODIUM BLD-SCNC: 143 MMOL/L (ref 132–146)
TOTAL PROTEIN: 5.6 G/DL (ref 6.4–8.3)
URINE CULTURE, ROUTINE: NORMAL
WBC # BLD: 13.4 E9/L (ref 4.5–11.5)

## 2019-07-17 PROCEDURE — 2580000003 HC RX 258: Performed by: INTERNAL MEDICINE

## 2019-07-17 PROCEDURE — 36415 COLL VENOUS BLD VENIPUNCTURE: CPT

## 2019-07-17 PROCEDURE — 80053 COMPREHEN METABOLIC PANEL: CPT

## 2019-07-17 PROCEDURE — 6360000002 HC RX W HCPCS: Performed by: INTERNAL MEDICINE

## 2019-07-17 PROCEDURE — 85025 COMPLETE CBC W/AUTO DIFF WBC: CPT

## 2019-07-17 PROCEDURE — 99233 SBSQ HOSP IP/OBS HIGH 50: CPT | Performed by: INTERNAL MEDICINE

## 2019-07-17 PROCEDURE — 6360000002 HC RX W HCPCS: Performed by: PHYSICAL MEDICINE & REHABILITATION

## 2019-07-17 PROCEDURE — 84132 ASSAY OF SERUM POTASSIUM: CPT

## 2019-07-17 PROCEDURE — 80048 BASIC METABOLIC PNL TOTAL CA: CPT

## 2019-07-17 RX ORDER — ONDANSETRON 4 MG/1
4 TABLET, ORALLY DISINTEGRATING ORAL 3 TIMES DAILY PRN
Qty: 6 TABLET | Refills: 0 | Status: SHIPPED | OUTPATIENT
Start: 2019-07-17

## 2019-07-17 RX ORDER — LORAZEPAM 2 MG/ML
1 INJECTION INTRAMUSCULAR ONCE
Status: COMPLETED | OUTPATIENT
Start: 2019-07-17 | End: 2019-07-17

## 2019-07-17 RX ADMIN — ENOXAPARIN SODIUM 40 MG: 40 INJECTION SUBCUTANEOUS at 08:45

## 2019-07-17 RX ADMIN — POTASSIUM CHLORIDE 10 MEQ: 7.46 INJECTION, SOLUTION INTRAVENOUS at 03:09

## 2019-07-17 RX ADMIN — Medication 10 ML: at 08:47

## 2019-07-17 RX ADMIN — CEFTRIAXONE SODIUM 1 G: 1 INJECTION, POWDER, FOR SOLUTION INTRAMUSCULAR; INTRAVENOUS at 14:37

## 2019-07-17 RX ADMIN — POTASSIUM CHLORIDE 10 MEQ: 7.46 INJECTION, SOLUTION INTRAVENOUS at 02:13

## 2019-07-17 RX ADMIN — LORAZEPAM 1 MG: 2 INJECTION INTRAMUSCULAR; INTRAVENOUS at 02:37

## 2019-07-17 RX ADMIN — VANCOMYCIN HYDROCHLORIDE 1000 MG: 1 INJECTION, POWDER, LYOPHILIZED, FOR SOLUTION INTRAVENOUS at 10:47

## 2019-07-17 RX ADMIN — POTASSIUM CHLORIDE 10 MEQ: 7.46 INJECTION, SOLUTION INTRAVENOUS at 01:12

## 2019-07-17 RX ADMIN — ONDANSETRON HYDROCHLORIDE 4 MG: 2 SOLUTION INTRAMUSCULAR; INTRAVENOUS at 08:55

## 2019-07-17 RX ADMIN — ONDANSETRON HYDROCHLORIDE 4 MG: 2 SOLUTION INTRAMUSCULAR; INTRAVENOUS at 01:32

## 2019-07-17 ASSESSMENT — PAIN SCALES - GENERAL: PAINLEVEL_OUTOF10: 0

## 2019-07-17 NOTE — PLAN OF CARE
Problem: Risk for Impaired Skin Integrity  Goal: Tissue integrity - skin and mucous membranes  Description  Structural intactness and normal physiological function of skin and  mucous membranes.   Outcome: Met This Shift     Problem: Falls - Risk of:  Goal: Will remain free from falls  Description  Will remain free from falls  7/17/2019 0333 by Martin Max RN  Outcome: Met This Shift       Problem: Urinary Elimination:  Goal: Signs and symptoms of infection will decrease  Description  Signs and symptoms of infection will decrease  Outcome: Met This Shift     Problem: Discharge Planning:  Goal: Ability to perform activities of daily living will improve  Description  Ability to perform activities of daily living will improve  7/17/2019 0333 by Martin Max RN  Outcome: Met This Shift     Problem: Discharge Planning:  Goal: Participates in care planning  Description  Participates in care planning  7/17/2019 0333 by Martin Max RN  Outcome: Met This Shift     Problem: Discharge Planning:  Goal: Absence of hematoma at arterial access site  Description  Participation in substance abuse program  Outcome: Met This Shift     Problem: Injury - Risk of, Physical Injury:  Goal: Absence of physical injury  Description  Absence of physical injury  Outcome: Met This Shift

## 2019-07-19 LAB
CSF CULTURE: NORMAL
GRAM STAIN RESULT: NORMAL

## 2019-07-20 LAB
BLOOD CULTURE, ROUTINE: NORMAL
CULTURE, BLOOD 2: NORMAL

## 2019-08-15 PROBLEM — E86.0 DEHYDRATION: Status: RESOLVED | Noted: 2019-07-16 | Resolved: 2019-08-15

## 2020-01-10 ENCOUNTER — HOSPITAL ENCOUNTER (OUTPATIENT)
Age: 35
Discharge: HOME | End: 2020-01-12
Payer: COMMERCIAL

## 2020-01-10 PROCEDURE — G0123 SCREEN CERV/VAG THIN LAYER: HCPCS

## 2020-01-10 PROCEDURE — 87624 HPV HI-RISK TYP POOLED RSLT: CPT

## 2020-01-16 LAB
HPV SAMPLE: ABNORMAL
HPV TYPE 16: NOT DETECTED
HPV TYPE 18: NOT DETECTED
HPV, HIGH RISK OTHER: DETECTED
INTERPRETATION: ABNORMAL
SOURCE: ABNORMAL

## 2020-02-12 ENCOUNTER — HOSPITAL ENCOUNTER (OUTPATIENT)
Age: 35
Discharge: HOME | End: 2020-02-14
Payer: COMMERCIAL

## 2020-02-12 PROCEDURE — 88305 TISSUE EXAM BY PATHOLOGIST: CPT

## 2020-09-23 ENCOUNTER — HOSPITAL ENCOUNTER (OUTPATIENT)
Age: 35
Discharge: HOME | End: 2020-09-25
Payer: COMMERCIAL

## 2020-09-23 LAB
BACTERIA: ABNORMAL /HPF
BILIRUBIN URINE: NEGATIVE
BLOOD, URINE: NEGATIVE
CLARITY: CLEAR
COLOR: YELLOW
GLUCOSE URINE: NEGATIVE MG/DL
KETONES, URINE: NEGATIVE MG/DL
LEUKOCYTE ESTERASE, URINE: ABNORMAL
NITRITE, URINE: NEGATIVE
PH UA: 6 (ref 5–9)
PROTEIN UA: NEGATIVE MG/DL
RBC UA: ABNORMAL /HPF (ref 0–2)
SPECIFIC GRAVITY UA: <=1.005 (ref 1–1.03)
UROBILINOGEN, URINE: 0.2 E.U./DL
WBC UA: ABNORMAL /HPF (ref 0–5)

## 2020-09-23 PROCEDURE — 81001 URINALYSIS AUTO W/SCOPE: CPT

## 2020-09-23 PROCEDURE — 87088 URINE BACTERIA CULTURE: CPT

## 2020-09-26 LAB — URINE CULTURE, ROUTINE: NORMAL

## 2023-08-28 ENCOUNTER — HOSPITAL ENCOUNTER (OUTPATIENT)
Age: 38
Discharge: HOME | End: 2023-08-28
Payer: COMMERCIAL

## 2023-08-28 LAB
ALBUMIN SERPL-MCNC: 4.6 G/DL (ref 3.5–5.2)
ALP SERPL-CCNC: 82 U/L (ref 35–104)
ALT SERPL-CCNC: 15 U/L (ref 0–32)
ANION GAP SERPL CALCULATED.3IONS-SCNC: 16 MMOL/L (ref 7–16)
AST SERPL-CCNC: 12 U/L (ref 0–31)
BILIRUB SERPL-MCNC: 0.3 MG/DL (ref 0–1.2)
BUN SERPL-MCNC: 8 MG/DL (ref 6–20)
CALCIUM SERPL-MCNC: 9.5 MG/DL (ref 8.6–10.2)
CHLORIDE SERPL-SCNC: 103 MMOL/L (ref 98–107)
CO2 SERPL-SCNC: 20 MMOL/L (ref 22–29)
CREAT SERPL-MCNC: 0.9 MG/DL (ref 0.5–1)
ERYTHROCYTE [DISTWIDTH] IN BLOOD BY AUTOMATED COUNT: 12 % (ref 11.5–15)
GFR SERPL CREATININE-BSD FRML MDRD: >60 ML/MIN/1.73M2
GLUCOSE SERPL-MCNC: 110 MG/DL (ref 74–99)
HCT VFR BLD AUTO: 38.5 % (ref 34–48)
HGB BLD-MCNC: 12.9 G/DL (ref 11.5–15.5)
MCH RBC QN AUTO: 30.6 PG (ref 26–35)
MCHC RBC AUTO-ENTMCNC: 33.5 G/DL (ref 32–34.5)
MCV RBC AUTO: 91.2 FL (ref 80–99.9)
PLATELET # BLD AUTO: 199 K/UL (ref 130–450)
PMV BLD AUTO: 10.7 FL (ref 7–12)
POTASSIUM SERPL-SCNC: 3.7 MMOL/L (ref 3.5–5)
PROT SERPL-MCNC: 7.7 G/DL (ref 6.4–8.3)
RBC # BLD AUTO: 4.22 M/UL (ref 3.5–5.5)
SODIUM SERPL-SCNC: 139 MMOL/L (ref 132–146)
WBC OTHER # BLD: 13.4 K/UL (ref 4.5–11.5)

## 2023-08-28 PROCEDURE — 87389 HIV-1 AG W/HIV-1&-2 AB AG IA: CPT

## 2023-08-28 PROCEDURE — 36415 COLL VENOUS BLD VENIPUNCTURE: CPT

## 2023-08-28 PROCEDURE — 85027 COMPLETE CBC AUTOMATED: CPT

## 2023-08-28 PROCEDURE — 86592 SYPHILIS TEST NON-TREP QUAL: CPT

## 2023-08-28 PROCEDURE — 80074 ACUTE HEPATITIS PANEL: CPT

## 2023-08-28 PROCEDURE — 80053 COMPREHEN METABOLIC PANEL: CPT

## 2023-08-29 LAB
HAV IGM SERPL QL IA: NONREACTIVE
HBV CORE IGM SERPL QL IA: NONREACTIVE
HBV SURFACE AG SERPL QL IA: NONREACTIVE
HCV AB SERPL QL IA: NONREACTIVE
HIV 1+2 AB+HIV1 P24 AG SERPL QL IA: NONREACTIVE
RPR SER QL: NONREACTIVE